# Patient Record
Sex: MALE | URBAN - METROPOLITAN AREA
[De-identification: names, ages, dates, MRNs, and addresses within clinical notes are randomized per-mention and may not be internally consistent; named-entity substitution may affect disease eponyms.]

---

## 2018-02-26 ENCOUNTER — RECORDS - HEALTHEAST (OUTPATIENT)
Dept: LAB | Facility: CLINIC | Age: 83
End: 2018-02-26

## 2018-02-27 ENCOUNTER — NURSING HOME VISIT (OUTPATIENT)
Dept: GERIATRICS | Facility: CLINIC | Age: 83
End: 2018-02-27
Payer: MEDICARE

## 2018-02-27 VITALS
TEMPERATURE: 96.6 F | HEART RATE: 63 BPM | OXYGEN SATURATION: 95 % | SYSTOLIC BLOOD PRESSURE: 121 MMHG | DIASTOLIC BLOOD PRESSURE: 68 MMHG | RESPIRATION RATE: 20 BRPM | WEIGHT: 187.3 LBS

## 2018-02-27 DIAGNOSIS — N40.0 BENIGN PROSTATIC HYPERPLASIA, UNSPECIFIED WHETHER LOWER URINARY TRACT SYMPTOMS PRESENT: ICD-10-CM

## 2018-02-27 DIAGNOSIS — I35.0 AORTIC VALVE STENOSIS, ETIOLOGY OF CARDIAC VALVE DISEASE UNSPECIFIED: ICD-10-CM

## 2018-02-27 DIAGNOSIS — R53.81 PHYSICAL DECONDITIONING: ICD-10-CM

## 2018-02-27 DIAGNOSIS — R42 DIZZINESS: Primary | ICD-10-CM

## 2018-02-27 DIAGNOSIS — I10 ESSENTIAL HYPERTENSION: ICD-10-CM

## 2018-02-27 DIAGNOSIS — K21.9 GASTROESOPHAGEAL REFLUX DISEASE, ESOPHAGITIS PRESENCE NOT SPECIFIED: ICD-10-CM

## 2018-02-27 DIAGNOSIS — G47.00 INSOMNIA, UNSPECIFIED TYPE: ICD-10-CM

## 2018-02-27 PROBLEM — K59.01 SLOW TRANSIT CONSTIPATION: Status: ACTIVE | Noted: 2018-02-27

## 2018-02-27 PROBLEM — F32.A DEPRESSION, UNSPECIFIED DEPRESSION TYPE: Status: ACTIVE | Noted: 2018-02-27
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
## 2018-02-27 PROCEDURE — 99309 SBSQ NF CARE MODERATE MDM 30: CPT | Performed by: NURSE PRACTITIONER

## 2018-02-27 RX ORDER — QUETIAPINE FUMARATE 25 MG/1
25 TABLET, FILM COATED ORAL AT BEDTIME
Qty: 30 TABLET | Refills: 1
Start: 2018-02-27

## 2018-02-27 RX ORDER — EMOLLIENT BASE
CREAM (GRAM) TOPICAL 2 TIMES DAILY PRN
COMMUNITY

## 2018-02-27 RX ORDER — PHENOL 1.4 %
1 AEROSOL, SPRAY (ML) MUCOUS MEMBRANE 2 TIMES DAILY
COMMUNITY

## 2018-02-27 RX ORDER — HYDROXYCHLOROQUINE SULFATE 200 MG/1
200 TABLET, FILM COATED ORAL 2 TIMES DAILY
COMMUNITY

## 2018-02-27 RX ORDER — CYANOCOBALAMIN (VITAMIN B-12) 500 MCG
400 LOZENGE ORAL DAILY
COMMUNITY

## 2018-02-27 RX ORDER — GUAIFENESIN 200 MG/10ML
10 LIQUID ORAL EVERY 4 HOURS PRN
COMMUNITY

## 2018-02-27 RX ORDER — TAMSULOSIN HYDROCHLORIDE 0.4 MG/1
0.4 CAPSULE ORAL DAILY
COMMUNITY

## 2018-02-27 RX ORDER — FLUTICASONE PROPIONATE 50 MCG
1 SPRAY, SUSPENSION (ML) NASAL DAILY PRN
COMMUNITY

## 2018-02-27 NOTE — PROGRESS NOTES
"[facility]  PRIMARY CARE PROVIDER AND CLINIC:  [facility] [address] / [facility] [address]  Chief Complaint   Patient presents with     Hospital F/U       HPI:    Jesus Kirkland is a 89 year old  (9/29/1925),admitted to the [facility] from [facility].  Hospital stay 2/19/18 through 2/23/18.  Admitted to this facility for  rehab, medical management and nursing care.      H&P Per [facility] Admission Note 2/19/18:        HPI information obtained from: facility chart records, facility staff and patient report.      Current issues are:      Dizziness  No dizziness since his original fall. On day of admission he did have orthostatic hypotension, so it was monitored the next several days. No recurrence.    Physical deconditioning  Working with PT/OT. Walking 150 feet with 4-wheeled walker. Independent with transfers    Aortic valve stenosis, etiology of cardiac valve disease unspecified  See above    Essential hypertension  BP readings range:  121/68  114/64  138/82  120/73  145/68  131/66  144/68  136/68  131/73  130/69  121/71    Gastroesophageal reflux disease, esophagitis presence not specified  Denies dyspepsia    Benign prostatic hyperplasia, unspecified whether lower urinary tract symptoms present  Current regimen Flomax 0.4mg po daily. Denies urinary retention    Insomnia, unspecified type  Patient has been taking melatonin and Seroquel at home. [facility] were for Seroquel 50mg take 1/2 tab qhs. This was unfortunately transcribed wrong and he has been getting 50mg qhs. He has noticed feeling a little \"goofy\" but again no dizziness or falls. He says he started taking the Seroquel due to severe anxiety about his health that was keeping him awake all night. This has now improved. He might be interested in trying to wean Seroquel        CODE STATUS/ADVANCE DIRECTIVES DISCUSSION:   DNR / DNI  Patient's living condition: [facility] " [address]ments    ALLERGIES:Oxybutynin  PAST MEDICAL HISTORY:  has no past medical history on file.  PAST SURGICAL HISTORY:  has no past surgical history on file.  FAMILY HISTORY: family history is not on file.  SOCIAL HISTORY:      Post Discharge Medication Reconciliation Status: discharge medications reconciled and changed, per note/orders (see AVS).  Current Outpatient Prescriptions   Medication Sig Dispense Refill     Acetaminophen (TYLENOL PO) Take 325-650 mg by mouth every 4 hours as needed for mild pain or fever       AmLODIPine Besylate (NORVASC PO) Take 10 mg by mouth daily       ASPIRIN EC PO Take 81 mg by mouth daily       calcium carbonate (OS-MARTÍN 600 MG [place]. [place]) 600 MG tablet Take 1 tablet by mouth 2 times daily       benzocaine-menthol (CEPACOL SORE THROAT) 15-3.6 MG lozenge Place 1 lozenge inside cheek every 4 hours as needed for moderate pain       CHLORTHALIDONE PO Take 12.5 mg by mouth daily       Omega-3 Fatty Acids (FISH OIL PO) Take 1,000 mg by mouth daily       FLUOXETINE HCL PO Take 10 mg by mouth daily       fluticasone (FLONASE) 50 MCG/ACT spray Spray 1 spray into both nostrils daily as needed for rhinitis or allergies       guaiFENesin (ROBITUSSIN) 100 MG/5ML LIQD Take 10 mLs by mouth every 4 hours as needed for cough       LOSARTAN POTASSIUM PO Take 75 mg by mouth daily       hydroxychloroquine (PLAQUENIL) 200 MG tablet Take 200 mg by mouth daily       MELATONIN PO Take 6 mg by mouth nightly as needed       Multiple Vitamins-Minerals (MULTIVITAMIN PO) Take 1 tablet by mouth 2 times daily       PREDNISONE PO Take 5 mg by mouth daily       QUEtiapine Fumarate (SEROQUEL PO) Take 50 mg by mouth At Bedtime       tamsulosin (FLOMAX) 0.4 MG capsule Take 0.4 mg by mouth daily       emollient (VANICREAM) cream Apply topically 2 times daily as needed for other       vitamin E 400 UNITS TABS Take 400 Units by mouth daily         ROS:  10 point ROS of systems including Constitutional, Eyes, Respiratory,  Cardiovascular, Gastroenterology, Genitourinary, Integumentary, Muscularskeletal, Psychiatric were all negative except for pertinent positives noted in my HPI.    Exam:  /68  Pulse 63  Temp 96.6  F (35.9  C)  Resp 20  Wt 187 lb 4.8 oz (85 kg)  SpO2 95%  GENERAL APPEARANCE:  Alert, in no distress, oriented  ENT:  Mouth and posterior oropharynx normal, moist mucous membranes  EYES:  EOM, conjunctivae, lids, pupils and irises normal  NECK:  No adenopathy,masses or thyromegaly  RESP:  respiratory effort and palpation of chest normal, lungs clear to auscultation , no respiratory distress  CV:  Palpation and auscultation of heart done , no edema, rate-normal, grade 3/6 murmur  ABDOMEN:  normal bowel sounds, soft, nontender, no hepatosplenomegaly or other masses  SKIN:  Inspection of skin and subcutaneous tissue baseline, Palpation of skin and subcutaneous tissue baseline  PSYCH:  oriented X 3, normal insight, judgement and memory, affect and mood normal    Lab/Diagnostic data:     Labs per [facility]:        ASSESSMENT/PLAN:  (R42) Dizziness  (primary encounter diagnosis)  Comment: Not currently an issue. It is surprising and fortunate that the inadvertent increase in Seroquel has not caused any side effects at this time. No orthostatic hypotension  Plan: Monitor for dizziness, falls    (R53.81) Physical deconditioning  Comment: Mild. Goal is to return back to his apartment  Plan: PT/OT eval and treat, discharge planning per their recommendations.    (I35.0) Aortic valve stenosis, etiology of cardiac valve disease unspecified  Comment: Chronic.   Plan: Continue current POC with no changes at this time and adjustments as needed. [facility]    (I10) Essential hypertension  Comment: Controlled. Due to aortic stenosis, would prefer BP<130/80, but due to falls and age, will keep goal at <140/80  Plan: Continue current POC with no changes at this time. Monitor BP. Adjust medication as clinically  indicated.    (K21.9) Gastroesophageal reflux disease, esophagitis presence not specified  Comment: Asymptomatic  Plan: Continue current POC with no changes at this time and adjustments as needed.    (N40.0) Benign prostatic hyperplasia, unspecified whether lower urinary tract symptoms present  Comment: No acute concerns  Plan: Continue current POC with no changes at this time and adjustments as needed.    (G47.00) Insomnia, unspecified type  Comment: Chronic. He likely could decrease his Seroquel dose, but due to the fact that he had 50mg for several days, will continue with 25mg for now.  Plan: Change seroquel to 25mg qhs per the original order. Continue melatonin.          Orders:  Change seroquel to 25mg qhs per the original order        Electronically signed by:  ROSEY Acharya [facility]  Pager: [phone]

## 2018-02-27 NOTE — LETTER
"    2/27/2018        RE: Jesus Kirkland  [address]  [place] [address]        [facility]  PRIMARY CARE PROVIDER AND CLINIC:  [facility] [address] / [facility] [address]  Chief Complaint   Patient presents with     Hospital F/U       HPI:    Jesus Kirkland is a 89 year old  (9/29/1925),admitted to the [facility] from [facility].  Hospital stay 2/19/18 through 2/23/18.  Admitted to this facility for  rehab, medical management and nursing care.      H&P Per [facility] Admission Note 2/19/18:        HPI information obtained from: facility chart records, facility staff and patient report.      Current issues are:      Dizziness  No dizziness since his original fall. On day of admission he did have orthostatic hypotension, so it was monitored the next several days. No recurrence.    Physical deconditioning  Working with PT/OT. Walking 150 feet with 4-wheeled walker. Independent with transfers    Aortic valve stenosis, etiology of cardiac valve disease unspecified  See above    Essential hypertension  BP readings range:  121/68  114/64  138/82  120/73  145/68  131/66  144/68  136/68  131/73  130/69  121/71    Gastroesophageal reflux disease, esophagitis presence not specified  Denies dyspepsia    Benign prostatic hyperplasia, unspecified whether lower urinary tract symptoms present  Current regimen Flomax 0.4mg po daily. Denies urinary retention    Insomnia, unspecified type  Patient has been taking melatonin and Seroquel at home. [facility] orders were for Seroquel 50mg take 1/2 tab qhs. This was unfortunately transcribed wrong and he has been getting 50mg qhs. He has noticed feeling a little \"goofy\" but again no dizziness or falls. He says he started taking the Seroquel due to severe anxiety about his health that was keeping him awake all night. This has now improved. He might be interested in trying to wean Seroquel        CODE " STATUS/ADVANCE DIRECTIVES DISCUSSION:   DNR / DNI  Patient's living condition: [facility]    ALLERGIES:Oxybutynin  PAST MEDICAL HISTORY:  has no past medical history on file.  PAST SURGICAL HISTORY:  has no past surgical history on file.  FAMILY HISTORY: family history is not on file.  SOCIAL HISTORY:      Post Discharge Medication Reconciliation Status: discharge medications reconciled and changed, per note/orders (see AVS).  Current Outpatient Prescriptions   Medication Sig Dispense Refill     Acetaminophen (TYLENOL PO) Take 325-650 mg by mouth every 4 hours as needed for mild pain or fever       AmLODIPine Besylate (NORVASC PO) Take 10 mg by mouth daily       ASPIRIN EC PO Take 81 mg by mouth daily       calcium carbonate (OS-MARTÍN 600 MG [place]. [place]) 600 MG tablet Take 1 tablet by mouth 2 times daily       benzocaine-menthol (CEPACOL SORE THROAT) 15-3.6 MG lozenge Place 1 lozenge inside cheek every 4 hours as needed for moderate pain       CHLORTHALIDONE PO Take 12.5 mg by mouth daily       Omega-3 Fatty Acids (FISH OIL PO) Take 1,000 mg by mouth daily       FLUOXETINE HCL PO Take 10 mg by mouth daily       fluticasone (FLONASE) 50 MCG/ACT spray Spray 1 spray into both nostrils daily as needed for rhinitis or allergies       guaiFENesin (ROBITUSSIN) 100 MG/5ML LIQD Take 10 mLs by mouth every 4 hours as needed for cough       LOSARTAN POTASSIUM PO Take 75 mg by mouth daily       hydroxychloroquine (PLAQUENIL) 200 MG tablet Take 200 mg by mouth daily       MELATONIN PO Take 6 mg by mouth nightly as needed       Multiple Vitamins-Minerals (MULTIVITAMIN PO) Take 1 tablet by mouth 2 times daily       PREDNISONE PO Take 5 mg by mouth daily       QUEtiapine Fumarate (SEROQUEL PO) Take 50 mg by mouth At Bedtime       tamsulosin (FLOMAX) 0.4 MG capsule Take 0.4 mg by mouth daily       emollient (VANICREAM) cream Apply topically 2 times daily as needed for other       vitamin E 400 UNITS TABS Take 400 Units by mouth  daily         ROS:  10 point ROS of systems including Constitutional, Eyes, Respiratory, Cardiovascular, Gastroenterology, Genitourinary, Integumentary, Muscularskeletal, Psychiatric were all negative except for pertinent positives noted in my HPI.    Exam:  /68  Pulse 63  Temp 96.6  F (35.9  C)  Resp 20  Wt 187 lb 4.8 oz (85 kg)  SpO2 95%  GENERAL APPEARANCE:  Alert, in no distress, oriented  ENT:  Mouth and posterior oropharynx normal, moist mucous membranes  EYES:  EOM, conjunctivae, lids, pupils and irises normal  NECK:  No adenopathy,masses or thyromegaly  RESP:  respiratory effort and palpation of chest normal, lungs clear to auscultation , no respiratory distress  CV:  Palpation and auscultation of heart done , no edema, rate-normal, grade 3/6 murmur  ABDOMEN:  normal bowel sounds, soft, nontender, no hepatosplenomegaly or other masses  SKIN:  Inspection of skin and subcutaneous tissue baseline, Palpation of skin and subcutaneous tissue baseline  PSYCH:  oriented X 3, normal insight, judgement and memory, affect and mood normal    Lab/Diagnostic data:     Labs per [facility]:        ASSESSMENT/PLAN:  (R42) Dizziness  (primary encounter diagnosis)  Comment: Not currently an issue. It is surprising and fortunate that the inadvertent increase in Seroquel has not caused any side effects at this time. No orthostatic hypotension  Plan: Monitor for dizziness, falls    (R53.81) Physical deconditioning  Comment: Mild. Goal is to return back to his apartment  Plan: PT/OT eval and treat, discharge planning per their recommendations.    (I35.0) Aortic valve stenosis, etiology of cardiac valve disease unspecified  Comment: Chronic.   Plan: Continue current POC with no changes at this time and adjustments as needed. [facility]    (I10) Essential hypertension  Comment: Controlled. Due to aortic stenosis, would prefer BP<130/80, but due to falls and age, will keep goal at <140/80  Plan: Continue current POC  with no changes at this time. Monitor BP. Adjust medication as clinically indicated.    (K21.9) Gastroesophageal reflux disease, esophagitis presence not specified  Comment: Asymptomatic  Plan: Continue current POC with no changes at this time and adjustments as needed.    (N40.0) Benign prostatic hyperplasia, unspecified whether lower urinary tract symptoms present  Comment: No acute concerns  Plan: Continue current POC with no changes at this time and adjustments as needed.    (G47.00) Insomnia, unspecified type  Comment: Chronic. He likely could decrease his Seroquel dose, but due to the fact that he had 50mg for several days, will continue with 25mg for now.  Plan: Change seroquel to 25mg qhs per the original order. Continue melatonin.          Orders:  Change seroquel to 25mg qhs per the original order        Electronically signed by:  ROSEY Acharya [facility]  Pager: [phone]

## 2018-02-28 LAB
QTF INTERPRETATION: NORMAL
QTF MITOGEN - NIL: >10 IU/ML
QTF NIL: 0.04 IU/ML
QTF RESULT: NEGATIVE
QTF TB ANTIGEN - NIL: 0.01 IU/ML

## 2018-03-02 ENCOUNTER — DISCHARGE SUMMARY NURSING HOME (OUTPATIENT)
Dept: GERIATRICS | Facility: CLINIC | Age: 83
End: 2018-03-02
Payer: MEDICARE

## 2018-03-02 VITALS
RESPIRATION RATE: 18 BRPM | DIASTOLIC BLOOD PRESSURE: 63 MMHG | WEIGHT: 185.1 LBS | SYSTOLIC BLOOD PRESSURE: 104 MMHG | OXYGEN SATURATION: 98 % | HEART RATE: 74 BPM | TEMPERATURE: 97 F

## 2018-03-02 DIAGNOSIS — I35.0 AORTIC VALVE STENOSIS, ETIOLOGY OF CARDIAC VALVE DISEASE UNSPECIFIED: ICD-10-CM

## 2018-03-02 DIAGNOSIS — R53.81 PHYSICAL DECONDITIONING: ICD-10-CM

## 2018-03-02 DIAGNOSIS — G47.00 INSOMNIA, UNSPECIFIED TYPE: ICD-10-CM

## 2018-03-02 DIAGNOSIS — F41.1 GAD (GENERALIZED ANXIETY DISORDER): ICD-10-CM

## 2018-03-02 DIAGNOSIS — K21.9 GASTROESOPHAGEAL REFLUX DISEASE, ESOPHAGITIS PRESENCE NOT SPECIFIED: ICD-10-CM

## 2018-03-02 DIAGNOSIS — I10 ESSENTIAL HYPERTENSION: ICD-10-CM

## 2018-03-02 DIAGNOSIS — R42 DIZZINESS: Primary | ICD-10-CM

## 2018-03-02 DIAGNOSIS — N40.0 BENIGN PROSTATIC HYPERPLASIA, UNSPECIFIED WHETHER LOWER URINARY TRACT SYMPTOMS PRESENT: ICD-10-CM

## 2018-03-02 PROCEDURE — 99316 NF DSCHRG MGMT 30 MIN+: CPT | Performed by: NURSE PRACTITIONER

## 2018-03-02 NOTE — PROGRESS NOTES
[facility] DISCHARGE SUMMARY    PATIENT'S NAME: Jesus Kirkland  YOB: 1925  MEDICAL RECORD NUMBER:  [id number]    PRIMARY CARE PROVIDER AND CLINIC RESPONSIBLE AFTER TRANSFER: [facility] [address] [facility] / [facility] [address]     CODE STATUS/ADVANCE DIRECTIVES DISCUSSION:   DNR / DNI       Allergies   Allergen Reactions     Oxybutynin        TRANSFERRING PROVIDERS: ROSEY Acharya CNP, Pillo Peter MD  DATE OF SNF ADMISSION:  February / 23 / 2018  DATE OF SNF (anticipated) DISCHARGE: March / 12 / 2018  DISCHARGE DISPOSITION: Non-FMG Provider   Nursing Facility: [facility] stay 2/19/18 to 2/23/18.     Condition on Discharge:  Improving.  Function:  Ambulating community distance with 4-wheeled walker  Cognitive Scores: BIMS 13/15, SLUMS 22/30 and PHQ9 06/27.        DISCHARGE DIAGNOSIS:   1. Dizziness    2. RAFIQ (generalized anxiety disorder)    3. Insomnia, unspecified type    4. Physical deconditioning    5. Aortic valve stenosis, etiology of cardiac valve disease unspecified    6. Essential hypertension    7. Gastroesophageal reflux disease, esophagitis presence not specified    8. Benign prostatic hyperplasia, unspecified whether lower urinary tract symptoms present        HPI Nursing Facility Course:  HPI information obtained from: facility chart records, facility staff and patient report.  [facility] Hospital stay 2/19/18 through 2/23/18. He was admitted due to dizziness and a fall. No definitive cause for dizziness was found. Admitted to this facility for  rehab, medical management and nursing care.    Dizziness  On day of admission he did have orthostatic hypotension, so it was monitored the next several days. No recurrence. He has had dizziness once or twice in the last several days.  He is wondering if it is related to his medications. His BP is generally stable. See RAFIQ.    RAFIQ  (generalized anxiety disorder)  Insomnia, unspecified type  [facility] due to severe anxiety. This is when Prozac and seroquel were started. He was having insomnia due to severe anxiety and this anxiety mostly revolved around his elevated blood pressure. He was worried about having a stroke or heart attack. He feels that his anxiety is improved. It can be challenging to change medications and have this communicated to the [facility]. Given that he is going home and has a [facility] follow up next week, no meds were changed, but he was given the following advice: He is to try taking Seroquel 12.5mg at bedtime. If he feels like he needs something to help him sleep, he may want to try trazodone. Additionally, for his anxiety, Prozac is not recommended in the elderly, suggested he ask about Remeron. Remeron could certainly help with both anxiety and insomnia.    Physical deconditioning  Completed PT/OT. Walking 150 feet with 4-wheeled walker. Independent with transfers    Aortic valve stenosis, etiology of cardiac valve disease unspecified  Cardiology felt that this was a likely cause of his dizziness. He also has ARROYO. There have been discussions regarding possible TAVR. F/u cardiology    Essential hypertension  BP readings range:  104/63  151/66  126/58  145/74  135/70  123/60  121/68  114/64  138/82  120/73  145/68      Gastroesophageal reflux disease, esophagitis presence not specified  Denies dyspepsia    Benign prostatic hyperplasia, unspecified whether lower urinary tract symptoms present  Current regimen Flomax 0.4mg po daily. Denies urinary retention        PAST MEDICAL HISTORY:  has no past medical history on file.    DISCHARGE MEDICATIONS:  Current Outpatient Prescriptions   Medication Sig Dispense Refill     VITAMIN D, CHOLECALCIFEROL, PO Take 2,000 Units by mouth daily       Acetaminophen (TYLENOL PO) Take 325-650 mg by mouth every 4 hours as needed for mild pain or fever        AmLODIPine Besylate (NORVASC PO) Take 10 mg by mouth daily       ASPIRIN EC PO Take 81 mg by mouth daily       calcium carbonate (OS-MARTÍN 600 MG [place]. [place]) 600 MG tablet Take 1 tablet by mouth 2 times daily       benzocaine-menthol (CEPACOL SORE THROAT) 15-3.6 MG lozenge Place 1 lozenge inside cheek every 4 hours as needed for moderate pain       CHLORTHALIDONE PO Take 12.5 mg by mouth daily       Omega-3 Fatty Acids (FISH OIL PO) Take 1,000 mg by mouth daily       FLUOXETINE HCL PO Take 10 mg by mouth daily       fluticasone (FLONASE) 50 MCG/ACT spray Spray 1 spray into both nostrils daily as needed for rhinitis or allergies       guaiFENesin (ROBITUSSIN) 100 MG/5ML LIQD Take 10 mLs by mouth every 4 hours as needed for cough       LOSARTAN POTASSIUM PO Take 75 mg by mouth daily       hydroxychloroquine (PLAQUENIL) 200 MG tablet Take 200 mg by mouth 2 times daily        MELATONIN PO Take 6 mg by mouth nightly as needed       Multiple Vitamins-Minerals (MULTIVITAMIN PO) Take 1 tablet by mouth 2 times daily       PREDNISONE PO Take 5 mg by mouth daily       tamsulosin (FLOMAX) 0.4 MG capsule Take 0.4 mg by mouth daily       emollient (VANICREAM) cream Apply topically 2 times daily as needed for other       vitamin E 400 UNITS TABS Take 400 Units by mouth daily       QUEtiapine (SEROQUEL) 25 MG tablet Take 1 tablet (25 mg) by mouth At Bedtime 30 tablet 1       MEDICATION CHANGES/RATIONALE:   None    Controlled medications sent with patient:   not applicable/none     ROS:    10 point ROS of systems including Constitutional, Eyes, Respiratory, Cardiovascular, Gastroenterology, Genitourinary, Integumentary, Muscularskeletal, Psychiatric were all negative except for pertinent positives noted in my HPI.    Physical Exam:   Vitals: /63  Pulse 74  Temp 97  F (36.1  C)  Resp 18  Wt 185 lb 1.6 oz (84 kg)  SpO2 98%  BMI= There is no height or weight on file to calculate BMI.    GENERAL APPEARANCE:  Alert, in no  distress, oriented  ENT:  Mouth and posterior oropharynx normal, moist mucous membranes, normal hearing acuity  EYES:  EOM, conjunctivae, lids, pupils and irises normal  NECK:  No adenopathy,masses or thyromegaly  RESP:  respiratory effort and palpation of chest normal, lungs clear to auscultation , no respiratory distress  CV:  Palpation and auscultation of heart done , no edema, rate-normal, grade 3/6 murmur  ABDOMEN:  normal bowel sounds, soft, nontender, no hepatosplenomegaly or other masses  M/S:   Gait and station normal  Digits and nails normal  SKIN:  Inspection of skin and subcutaneous tissue baseline, Palpation of skin and subcutaneous tissue baseline  PSYCH:  oriented X 3, normal insight, judgement and memory, affect and mood normal    DISCHARGE PLAN:  Registered Nurse and Home Health Aide. D/c with medications per protocol. HHC RN for medications mangament and skin tear to left upper arm wound care: Cleanse w/NS or wound cleanser; apply skin prep tp periwound skin; apply small piece of adaptic or petrolatum gauze over open areas; cover w/ silicine foam border dressing. HHC RN for Medication management and HHA to assess for any and all services incliding bathing assist. .     Patient instructed to follow-up with:  PCP in 7 days      Future Appointments:   [facility]    [facility] referral needed and placed by this provider: No    Pending labs: none    SNF labs:      [facility]            Discharge Treatments: none    TOTAL DISCHARGE TIME:   Greater than 30 minutes     Electronically signed by:  ROSEY Acharya CNP   [facility]  Pager: [phone]      Case reviewed with Giulia Chan CNP on 3/2/18    Pillo Peter MD

## 2018-03-02 NOTE — LETTER
3/2/2018        RE: Jesus Kirkland  [address]  [facility] [id number]          [facility] DISCHARGE SUMMARY    PATIENT'S NAME: Jesus Kirkland  YOB: 1925  MEDICAL RECORD NUMBER:  [id number]    PRIMARY CARE PROVIDER AND CLINIC RESPONSIBLE AFTER TRANSFER: [place], [facility] [address] / [facility] [id number]     CODE STATUS/ADVANCE DIRECTIVES DISCUSSION:   DNR / DNI       Allergies   Allergen Reactions     Oxybutynin        TRANSFERRING PROVIDERS: ROSEY Acharya CNP, Pillo Petre MD  DATE OF SNF ADMISSION:  February / 23 / 2018  DATE OF SNF (anticipated) DISCHARGE: March / 12 / 2018  DISCHARGE DISPOSITION: Non-[facility] Provider   Nursing Facility: [facility] stay 2/19/18 to 2/23/18.     Condition on Discharge:  Improving.  Function:  Ambulating community distance with 4-wheeled walker  Cognitive Scores: BIMS 13/15, SLUMS 22/30 and PHQ9 06/27.        DISCHARGE DIAGNOSIS:   1. Dizziness    2. RAFIQ (generalized anxiety disorder)    3. Insomnia, unspecified type    4. Physical deconditioning    5. Aortic valve stenosis, etiology of cardiac valve disease unspecified    6. Essential hypertension    7. Gastroesophageal reflux disease, esophagitis presence not specified    8. Benign prostatic hyperplasia, unspecified whether lower urinary tract symptoms present        HPI Nursing Facility Course:  HPI information obtained from: facility chart records, facility staff and patient report.  [facility] stay 2/19/18 through 2/23/18. He was admitted due to dizziness and a fall. No definitive cause for dizziness was found. Admitted to this facility for  rehab, medical management and nursing care.    Dizziness  On day of admission he did have orthostatic hypotension, so it was monitored the next several days. No recurrence. He has had dizziness once or twice in the last several days.  He is wondering  if it is related to his medications. His BP is generally stable. See RAFIQ.    RAFIQ (generalized anxiety disorder)  Insomnia, unspecified type  [facility]. This is when Prozac and seroquel were started. He was having insomnia due to severe anxiety and this anxiety mostly revolved around his elevated blood pressure. He was worried about having a stroke or heart attack. He feels that his anxiety is improved. It can be challenging to change medications and have this communicated to the [facility]. Given that he is going home and has a [facility] follow up next week, no meds were changed, but he was given the following advice: He is to try taking Seroquel 12.5mg at bedtime. If he feels like he needs something to help him sleep, he may want to try trazodone. Additionally, for his anxiety, Prozac is not recommended in the elderly, suggested he ask about Remeron. Remeron could certainly help with both anxiety and insomnia.    Physical deconditioning  Completed PT/OT. Walking 150 feet with 4-wheeled walker. Independent with transfers    Aortic valve stenosis, etiology of cardiac valve disease unspecified  Cardiology felt that this was a likely cause of his dizziness. He also has ARROYO. There have been discussions regarding possible TAVR. F/u cardiology    Essential hypertension  BP readings range:  104/63  151/66  126/58  145/74  135/70  123/60  121/68  114/64  138/82  120/73  145/68      Gastroesophageal reflux disease, esophagitis presence not specified  Denies dyspepsia    Benign prostatic hyperplasia, unspecified whether lower urinary tract symptoms present  Current regimen Flomax 0.4mg po daily. Denies urinary retention        PAST MEDICAL HISTORY:  has no past medical history on file.    DISCHARGE MEDICATIONS:  Current Outpatient Prescriptions   Medication Sig Dispense Refill     VITAMIN D, CHOLECALCIFEROL, PO Take 2,000 Units by mouth daily       Acetaminophen (TYLENOL  PO) Take 325-650 mg by mouth every 4 hours as needed for mild pain or fever       AmLODIPine Besylate (NORVASC PO) Take 10 mg by mouth daily       ASPIRIN EC PO Take 81 mg by mouth daily       calcium carbonate (OS-MARTÍN 600 MG [place]. [place]) 600 MG tablet Take 1 tablet by mouth 2 times daily       benzocaine-menthol (CEPACOL SORE THROAT) 15-3.6 MG lozenge Place 1 lozenge inside cheek every 4 hours as needed for moderate pain       CHLORTHALIDONE PO Take 12.5 mg by mouth daily       Omega-3 Fatty Acids (FISH OIL PO) Take 1,000 mg by mouth daily       FLUOXETINE HCL PO Take 10 mg by mouth daily       fluticasone (FLONASE) 50 MCG/ACT spray Spray 1 spray into both nostrils daily as needed for rhinitis or allergies       guaiFENesin (ROBITUSSIN) 100 MG/5ML LIQD Take 10 mLs by mouth every 4 hours as needed for cough       LOSARTAN POTASSIUM PO Take 75 mg by mouth daily       hydroxychloroquine (PLAQUENIL) 200 MG tablet Take 200 mg by mouth 2 times daily        MELATONIN PO Take 6 mg by mouth nightly as needed       Multiple Vitamins-Minerals (MULTIVITAMIN PO) Take 1 tablet by mouth 2 times daily       PREDNISONE PO Take 5 mg by mouth daily       tamsulosin (FLOMAX) 0.4 MG capsule Take 0.4 mg by mouth daily       emollient (VANICREAM) cream Apply topically 2 times daily as needed for other       vitamin E 400 UNITS TABS Take 400 Units by mouth daily       QUEtiapine (SEROQUEL) 25 MG tablet Take 1 tablet (25 mg) by mouth At Bedtime 30 tablet 1       MEDICATION CHANGES/RATIONALE:   None    Controlled medications sent with patient:   not applicable/none     ROS:    10 point ROS of systems including Constitutional, Eyes, Respiratory, Cardiovascular, Gastroenterology, Genitourinary, Integumentary, Muscularskeletal, Psychiatric were all negative except for pertinent positives noted in my HPI.    Physical Exam:   Vitals: /63  Pulse 74  Temp 97  F (36.1  C)  Resp 18  Wt 185 lb 1.6 oz (84 kg)  SpO2 98%  BMI= There is no height  or weight on file to calculate BMI.    GENERAL APPEARANCE:  Alert, in no distress, oriented  ENT:  Mouth and posterior oropharynx normal, moist mucous membranes, normal hearing acuity  EYES:  EOM, conjunctivae, lids, pupils and irises normal  NECK:  No adenopathy,masses or thyromegaly  RESP:  respiratory effort and palpation of chest normal, lungs clear to auscultation , no respiratory distress  CV:  Palpation and auscultation of heart done , no edema, rate-normal, grade 3/6 murmur  ABDOMEN:  normal bowel sounds, soft, nontender, no hepatosplenomegaly or other masses  M/S:   Gait and station normal  Digits and nails normal  SKIN:  Inspection of skin and subcutaneous tissue baseline, Palpation of skin and subcutaneous tissue baseline  PSYCH:  oriented X 3, normal insight, judgement and memory, affect and mood normal    DISCHARGE PLAN:  Registered Nurse and Home Health Aide. D/c with medications per protocol. [facility] RN for medications mangament and skin tear to left upper arm wound care: Cleanse w/NS or wound cleanser; apply skin prep tp periwound skin; apply small piece of adaptic or petrolatum gauze over open areas; cover w/ silicine foam border dressing. [facility] RN for Medication management and [facility]HA to assess for any and all services incliding bathing assist. .     Patient instructed to follow-up with:  PCP in 7 days      Future Appointments:   [facility]    [facility] referral needed and placed by this provider: No    Pending labs: none    SNF labs:      [facility]:            Discharge Treatments: none    TOTAL DISCHARGE TIME:   Greater than 30 minutes     Electronically signed by:  ROSEY Acharya [facility]  Pager: [phone]

## 2019-11-10 ENCOUNTER — RECORDS - HEALTHEAST (OUTPATIENT)
Dept: LAB | Facility: CLINIC | Age: 84
End: 2019-11-10

## 2019-11-10 LAB
C DIFF TOX B STL QL: POSITIVE
RIBOTYPE 027/NAP1/BI: ABNORMAL

## 2019-11-26 ENCOUNTER — RECORDS - HEALTHEAST (OUTPATIENT)
Dept: LAB | Facility: CLINIC | Age: 84
End: 2019-11-26

## 2019-11-27 LAB
ANION GAP SERPL CALCULATED.3IONS-SCNC: 9 MMOL/L (ref 5–18)
BUN SERPL-MCNC: 41 MG/DL (ref 8–28)
CALCIUM SERPL-MCNC: 8.8 MG/DL (ref 8.5–10.5)
CHLORIDE BLD-SCNC: 97 MMOL/L (ref 98–107)
CO2 SERPL-SCNC: 21 MMOL/L (ref 22–31)
CREAT SERPL-MCNC: 1.32 MG/DL (ref 0.7–1.3)
GFR SERPL CREATININE-BSD FRML MDRD: 51 ML/MIN/1.73M2
GLUCOSE BLD-MCNC: 101 MG/DL (ref 70–125)
POTASSIUM BLD-SCNC: 6.1 MMOL/L (ref 3.5–5)
SODIUM SERPL-SCNC: 127 MMOL/L (ref 136–145)

## 2019-12-02 ENCOUNTER — RECORDS - HEALTHEAST (OUTPATIENT)
Dept: LAB | Facility: CLINIC | Age: 84
End: 2019-12-02

## 2019-12-02 LAB
ANION GAP SERPL CALCULATED.3IONS-SCNC: 9 MMOL/L (ref 5–18)
BUN SERPL-MCNC: 30 MG/DL (ref 8–28)
CALCIUM SERPL-MCNC: 8.7 MG/DL (ref 8.5–10.5)
CHLORIDE BLD-SCNC: 100 MMOL/L (ref 98–107)
CO2 SERPL-SCNC: 23 MMOL/L (ref 22–31)
CREAT SERPL-MCNC: 1.02 MG/DL (ref 0.7–1.3)
GFR SERPL CREATININE-BSD FRML MDRD: >60 ML/MIN/1.73M2
GLUCOSE BLD-MCNC: 107 MG/DL (ref 70–125)
POTASSIUM BLD-SCNC: 5 MMOL/L (ref 3.5–5)
SODIUM SERPL-SCNC: 132 MMOL/L (ref 136–145)

## 2019-12-03 ENCOUNTER — RECORDS - HEALTHEAST (OUTPATIENT)
Dept: LAB | Facility: CLINIC | Age: 84
End: 2019-12-03

## 2019-12-04 LAB
ANION GAP SERPL CALCULATED.3IONS-SCNC: 8 MMOL/L (ref 5–18)
BUN SERPL-MCNC: 23 MG/DL (ref 8–28)
CALCIUM SERPL-MCNC: 8.9 MG/DL (ref 8.5–10.5)
CHLORIDE BLD-SCNC: 100 MMOL/L (ref 98–107)
CO2 SERPL-SCNC: 27 MMOL/L (ref 22–31)
CREAT SERPL-MCNC: 1.11 MG/DL (ref 0.7–1.3)
GFR SERPL CREATININE-BSD FRML MDRD: >60 ML/MIN/1.73M2
GLUCOSE BLD-MCNC: 81 MG/DL (ref 70–125)
POTASSIUM BLD-SCNC: 4.7 MMOL/L (ref 3.5–5)
SODIUM SERPL-SCNC: 135 MMOL/L (ref 136–145)

## 2019-12-10 ENCOUNTER — RECORDS - HEALTHEAST (OUTPATIENT)
Dept: LAB | Facility: CLINIC | Age: 84
End: 2019-12-10

## 2019-12-11 LAB
ANION GAP SERPL CALCULATED.3IONS-SCNC: 8 MMOL/L (ref 5–18)
BUN SERPL-MCNC: 25 MG/DL (ref 8–28)
CALCIUM SERPL-MCNC: 8.7 MG/DL (ref 8.5–10.5)
CHLORIDE BLD-SCNC: 102 MMOL/L (ref 98–107)
CO2 SERPL-SCNC: 28 MMOL/L (ref 22–31)
CREAT SERPL-MCNC: 1.05 MG/DL (ref 0.7–1.3)
GFR SERPL CREATININE-BSD FRML MDRD: >60 ML/MIN/1.73M2
GLUCOSE BLD-MCNC: 78 MG/DL (ref 70–125)
POTASSIUM BLD-SCNC: 4.1 MMOL/L (ref 3.5–5)
SODIUM SERPL-SCNC: 138 MMOL/L (ref 136–145)

## 2020-02-25 ENCOUNTER — RECORDS - HEALTHEAST (OUTPATIENT)
Dept: LAB | Facility: CLINIC | Age: 85
End: 2020-02-25

## 2020-02-26 LAB
ANION GAP SERPL CALCULATED.3IONS-SCNC: 10 MMOL/L (ref 5–18)
BUN SERPL-MCNC: 35 MG/DL (ref 8–28)
CALCIUM SERPL-MCNC: 9.3 MG/DL (ref 8.5–10.5)
CHLORIDE BLD-SCNC: 104 MMOL/L (ref 98–107)
CO2 SERPL-SCNC: 27 MMOL/L (ref 22–31)
CREAT SERPL-MCNC: 0.95 MG/DL (ref 0.7–1.3)
ERYTHROCYTE [DISTWIDTH] IN BLOOD BY AUTOMATED COUNT: 12.3 % (ref 11–14.5)
GFR SERPL CREATININE-BSD FRML MDRD: >60 ML/MIN/1.73M2
GLUCOSE BLD-MCNC: 85 MG/DL (ref 70–125)
HCT VFR BLD AUTO: 30.4 % (ref 40–54)
HGB BLD-MCNC: 9.5 G/DL (ref 14–18)
MCH RBC QN AUTO: 30.8 PG (ref 27–34)
MCHC RBC AUTO-ENTMCNC: 31.3 G/DL (ref 32–36)
MCV RBC AUTO: 99 FL (ref 80–100)
PLATELET # BLD AUTO: 252 THOU/UL (ref 140–440)
PMV BLD AUTO: 12 FL (ref 8.5–12.5)
POTASSIUM BLD-SCNC: 3.8 MMOL/L (ref 3.5–5)
RBC # BLD AUTO: 3.08 MILL/UL (ref 4.4–6.2)
SODIUM SERPL-SCNC: 141 MMOL/L (ref 136–145)
WBC: 8.2 THOU/UL (ref 4–11)

## 2021-02-05 ENCOUNTER — RECORDS - HEALTHEAST (OUTPATIENT)
Dept: LAB | Facility: CLINIC | Age: 86
End: 2021-02-05

## 2021-02-05 LAB
SARS-COV-2 PCR COMMENT: NORMAL
SARS-COV-2 RNA SPEC QL NAA+PROBE: NEGATIVE
SARS-COV-2 VIRUS SPECIMEN SOURCE: NORMAL

## 2021-02-12 ENCOUNTER — RECORDS - HEALTHEAST (OUTPATIENT)
Dept: LAB | Facility: CLINIC | Age: 86
End: 2021-02-12

## 2021-02-23 ENCOUNTER — RECORDS - HEALTHEAST (OUTPATIENT)
Dept: LAB | Facility: CLINIC | Age: 86
End: 2021-02-23

## 2021-02-24 LAB
ALBUMIN SERPL-MCNC: 2.9 G/DL (ref 3.5–5)
ALP SERPL-CCNC: 61 U/L (ref 45–120)
ALT SERPL W P-5'-P-CCNC: <9 U/L (ref 0–45)
ANION GAP SERPL CALCULATED.3IONS-SCNC: 10 MMOL/L (ref 5–18)
AST SERPL W P-5'-P-CCNC: 18 U/L (ref 0–40)
BASOPHILS # BLD AUTO: 0 THOU/UL (ref 0–0.2)
BASOPHILS NFR BLD AUTO: 0 % (ref 0–2)
BILIRUB SERPL-MCNC: 0.2 MG/DL (ref 0–1)
BUN SERPL-MCNC: 38 MG/DL (ref 8–28)
CALCIUM SERPL-MCNC: 8.4 MG/DL (ref 8.5–10.5)
CHLORIDE BLD-SCNC: 105 MMOL/L (ref 98–107)
CO2 SERPL-SCNC: 27 MMOL/L (ref 22–31)
CREAT SERPL-MCNC: 1.4 MG/DL (ref 0.7–1.3)
EOSINOPHIL # BLD AUTO: 0.1 THOU/UL (ref 0–0.4)
EOSINOPHIL NFR BLD AUTO: 1 % (ref 0–6)
ERYTHROCYTE [DISTWIDTH] IN BLOOD BY AUTOMATED COUNT: 12.6 % (ref 11–14.5)
GFR SERPL CREATININE-BSD FRML MDRD: 47 ML/MIN/1.73M2
GLUCOSE BLD-MCNC: 83 MG/DL (ref 70–125)
HBA1C MFR BLD: 5.3 %
HCT VFR BLD AUTO: 27.1 % (ref 40–54)
HGB BLD-MCNC: 8.5 G/DL (ref 14–18)
IMM GRANULOCYTES # BLD: 0.1 THOU/UL
IMM GRANULOCYTES NFR BLD: 1 %
LYMPHOCYTES # BLD AUTO: 2.9 THOU/UL (ref 0.8–4.4)
LYMPHOCYTES NFR BLD AUTO: 29 % (ref 20–40)
MCH RBC QN AUTO: 30.2 PG (ref 27–34)
MCHC RBC AUTO-ENTMCNC: 31.4 G/DL (ref 32–36)
MCV RBC AUTO: 96 FL (ref 80–100)
MONOCYTES # BLD AUTO: 1.4 THOU/UL (ref 0–0.9)
MONOCYTES NFR BLD AUTO: 13 % (ref 2–10)
NEUTROPHILS # BLD AUTO: 5.7 THOU/UL (ref 2–7.7)
NEUTROPHILS NFR BLD AUTO: 56 % (ref 50–70)
PLATELET # BLD AUTO: 240 THOU/UL (ref 140–440)
PMV BLD AUTO: 11.3 FL (ref 8.5–12.5)
POTASSIUM BLD-SCNC: 3.7 MMOL/L (ref 3.5–5)
PROT SERPL-MCNC: 5.8 G/DL (ref 6–8)
RBC # BLD AUTO: 2.81 MILL/UL (ref 4.4–6.2)
SODIUM SERPL-SCNC: 142 MMOL/L (ref 136–145)
WBC: 10.3 THOU/UL (ref 4–11)

## 2021-02-26 ENCOUNTER — RECORDS - HEALTHEAST (OUTPATIENT)
Dept: LAB | Facility: CLINIC | Age: 86
End: 2021-02-26

## 2021-03-05 ENCOUNTER — RECORDS - HEALTHEAST (OUTPATIENT)
Dept: LAB | Facility: CLINIC | Age: 86
End: 2021-03-05

## 2021-04-06 ENCOUNTER — RECORDS - HEALTHEAST (OUTPATIENT)
Dept: LAB | Facility: CLINIC | Age: 86
End: 2021-04-06

## 2021-04-07 LAB
ANION GAP SERPL CALCULATED.3IONS-SCNC: 12 MMOL/L (ref 5–18)
BUN SERPL-MCNC: 35 MG/DL (ref 8–28)
CALCIUM SERPL-MCNC: 8.5 MG/DL (ref 8.5–10.5)
CHLORIDE BLD-SCNC: 104 MMOL/L (ref 98–107)
CO2 SERPL-SCNC: 25 MMOL/L (ref 22–31)
CREAT SERPL-MCNC: 1.25 MG/DL (ref 0.7–1.3)
GFR SERPL CREATININE-BSD FRML MDRD: 54 ML/MIN/1.73M2
GLUCOSE BLD-MCNC: 79 MG/DL (ref 70–125)
POTASSIUM BLD-SCNC: 3.6 MMOL/L (ref 3.5–5)
SODIUM SERPL-SCNC: 141 MMOL/L (ref 136–145)

## 2021-04-15 ENCOUNTER — RECORDS - HEALTHEAST (OUTPATIENT)
Dept: LAB | Facility: CLINIC | Age: 86
End: 2021-04-15

## 2021-04-22 ENCOUNTER — RECORDS - HEALTHEAST (OUTPATIENT)
Dept: LAB | Facility: CLINIC | Age: 86
End: 2021-04-22

## 2021-04-29 ENCOUNTER — RECORDS - HEALTHEAST (OUTPATIENT)
Dept: LAB | Facility: CLINIC | Age: 86
End: 2021-04-29

## 2021-05-04 ENCOUNTER — RECORDS - HEALTHEAST (OUTPATIENT)
Dept: LAB | Facility: CLINIC | Age: 86
End: 2021-05-04

## 2021-05-05 LAB
ANION GAP SERPL CALCULATED.3IONS-SCNC: 11 MMOL/L (ref 5–18)
BASOPHILS # BLD AUTO: 0 THOU/UL (ref 0–0.2)
BASOPHILS NFR BLD AUTO: 0 % (ref 0–2)
BUN SERPL-MCNC: 28 MG/DL (ref 8–28)
CALCIUM SERPL-MCNC: 8.6 MG/DL (ref 8.5–10.5)
CHLORIDE BLD-SCNC: 99 MMOL/L (ref 98–107)
CO2 SERPL-SCNC: 28 MMOL/L (ref 22–31)
CREAT SERPL-MCNC: 1.23 MG/DL (ref 0.7–1.3)
EOSINOPHIL # BLD AUTO: 0.1 THOU/UL (ref 0–0.4)
EOSINOPHIL NFR BLD AUTO: 2 % (ref 0–6)
ERYTHROCYTE [DISTWIDTH] IN BLOOD BY AUTOMATED COUNT: 13 % (ref 11–14.5)
FERRITIN SERPL-MCNC: 76 NG/ML (ref 27–300)
FOLATE SERPL-MCNC: 8.2 NG/ML
GFR SERPL CREATININE-BSD FRML MDRD: 55 ML/MIN/1.73M2
GLUCOSE BLD-MCNC: 74 MG/DL (ref 70–125)
HCT VFR BLD AUTO: 27.6 % (ref 40–54)
HGB BLD-MCNC: 9 G/DL (ref 14–18)
IMM GRANULOCYTES # BLD: 0 THOU/UL
IMM GRANULOCYTES NFR BLD: 1 %
IRON SATN MFR SERPL: 20 % (ref 20–50)
IRON SERPL-MCNC: 50 UG/DL (ref 42–175)
LYMPHOCYTES # BLD AUTO: 2.8 THOU/UL (ref 0.8–4.4)
LYMPHOCYTES NFR BLD AUTO: 35 % (ref 20–40)
MCH RBC QN AUTO: 30.2 PG (ref 27–34)
MCHC RBC AUTO-ENTMCNC: 32.6 G/DL (ref 32–36)
MCV RBC AUTO: 93 FL (ref 80–100)
MONOCYTES # BLD AUTO: 1 THOU/UL (ref 0–0.9)
MONOCYTES NFR BLD AUTO: 12 % (ref 2–10)
NEUTROPHILS # BLD AUTO: 4 THOU/UL (ref 2–7.7)
NEUTROPHILS NFR BLD AUTO: 50 % (ref 50–70)
PLATELET # BLD AUTO: 265 THOU/UL (ref 140–440)
PMV BLD AUTO: 11 FL (ref 8.5–12.5)
POTASSIUM BLD-SCNC: 3.4 MMOL/L (ref 3.5–5)
RBC # BLD AUTO: 2.98 MILL/UL (ref 4.4–6.2)
SODIUM SERPL-SCNC: 138 MMOL/L (ref 136–145)
TIBC SERPL-MCNC: 252 UG/DL (ref 313–563)
TRANSFERRIN SERPL-MCNC: 202 MG/DL (ref 212–360)
VIT B12 SERPL-MCNC: 421 PG/ML (ref 213–816)
WBC: 7.9 THOU/UL (ref 4–11)

## 2021-05-06 ENCOUNTER — RECORDS - HEALTHEAST (OUTPATIENT)
Dept: LAB | Facility: CLINIC | Age: 86
End: 2021-05-06

## 2021-05-10 LAB
ALBUMIN PERCENT: 62 % (ref 51–67)
ALBUMIN SERPL ELPH-MCNC: 3.5 G/DL (ref 3.2–4.7)
ALPHA 1 PERCENT: 3.1 % (ref 2–4)
ALPHA 2 PERCENT: 11.7 % (ref 5–13)
ALPHA1 GLOB SERPL ELPH-MCNC: 0.2 G/DL (ref 0.1–0.3)
ALPHA2 GLOB SERPL ELPH-MCNC: 0.7 G/DL (ref 0.4–0.9)
B-GLOBULIN SERPL ELPH-MCNC: 0.8 G/DL (ref 0.7–1.2)
BETA PERCENT: 13.8 % (ref 10–17)
GAMMA GLOB SERPL ELPH-MCNC: 0.5 G/DL (ref 0.6–1.4)
GAMMA GLOBULIN PERCENT: 9.4 % (ref 9–20)
PATH ICD:: ABNORMAL
PROT PATTERN SERPL ELPH-IMP: ABNORMAL
PROT SERPL-MCNC: 5.7 G/DL (ref 6–8)
REVIEWING PATHOLOGIST: ABNORMAL

## 2021-08-09 ENCOUNTER — LAB REQUISITION (OUTPATIENT)
Dept: LAB | Facility: CLINIC | Age: 86
End: 2021-08-09
Payer: COMMERCIAL

## 2021-08-09 DIAGNOSIS — U07.1 COVID-19: ICD-10-CM

## 2021-08-11 PROCEDURE — U0005 INFEC AGEN DETEC AMPLI PROBE: HCPCS | Mod: ORL

## 2021-08-12 LAB — SARS-COV-2 RNA RESP QL NAA+PROBE: NEGATIVE

## 2021-08-16 ENCOUNTER — LAB REQUISITION (OUTPATIENT)
Dept: LAB | Facility: CLINIC | Age: 86
End: 2021-08-16
Payer: COMMERCIAL

## 2021-08-18 PROCEDURE — U0005 INFEC AGEN DETEC AMPLI PROBE: HCPCS | Mod: ORL

## 2021-08-19 LAB — SARS-COV-2 RNA RESP QL NAA+PROBE: NEGATIVE

## 2021-08-24 ENCOUNTER — LAB REQUISITION (OUTPATIENT)
Dept: LAB | Facility: CLINIC | Age: 86
End: 2021-08-24
Payer: COMMERCIAL

## 2021-08-25 PROCEDURE — U0003 INFECTIOUS AGENT DETECTION BY NUCLEIC ACID (DNA OR RNA); SEVERE ACUTE RESPIRATORY SYNDROME CORONAVIRUS 2 (SARS-COV-2) (CORONAVIRUS DISEASE [COVID-19]), AMPLIFIED PROBE TECHNIQUE, MAKING USE OF HIGH THROUGHPUT TECHNOLOGIES AS DESCRIBED BY CMS-2020-01-R: HCPCS | Mod: ORL

## 2021-08-26 LAB — SARS-COV-2 RNA RESP QL NAA+PROBE: NEGATIVE

## 2021-08-30 ENCOUNTER — LAB REQUISITION (OUTPATIENT)
Dept: LAB | Facility: CLINIC | Age: 86
End: 2021-08-30
Payer: COMMERCIAL

## 2021-09-01 PROCEDURE — U0003 INFECTIOUS AGENT DETECTION BY NUCLEIC ACID (DNA OR RNA); SEVERE ACUTE RESPIRATORY SYNDROME CORONAVIRUS 2 (SARS-COV-2) (CORONAVIRUS DISEASE [COVID-19]), AMPLIFIED PROBE TECHNIQUE, MAKING USE OF HIGH THROUGHPUT TECHNOLOGIES AS DESCRIBED BY CMS-2020-01-R: HCPCS | Mod: ORL

## 2021-09-03 ENCOUNTER — LAB REQUISITION (OUTPATIENT)
Dept: LAB | Facility: CLINIC | Age: 86
End: 2021-09-03
Payer: COMMERCIAL

## 2021-09-03 LAB — SARS-COV-2 RNA RESP QL NAA+PROBE: NEGATIVE

## 2021-09-08 PROCEDURE — U0005 INFEC AGEN DETEC AMPLI PROBE: HCPCS | Mod: ORL

## 2021-09-09 LAB — SARS-COV-2 RNA RESP QL NAA+PROBE: NEGATIVE

## 2021-09-12 ENCOUNTER — LAB REQUISITION (OUTPATIENT)
Dept: LAB | Facility: CLINIC | Age: 86
End: 2021-09-12
Payer: COMMERCIAL

## 2021-09-12 LAB — SARS-COV-2 RNA RESP QL NAA+PROBE: NEGATIVE

## 2021-09-12 PROCEDURE — U0003 INFECTIOUS AGENT DETECTION BY NUCLEIC ACID (DNA OR RNA); SEVERE ACUTE RESPIRATORY SYNDROME CORONAVIRUS 2 (SARS-COV-2) (CORONAVIRUS DISEASE [COVID-19]), AMPLIFIED PROBE TECHNIQUE, MAKING USE OF HIGH THROUGHPUT TECHNOLOGIES AS DESCRIBED BY CMS-2020-01-R: HCPCS | Mod: ORL | Performed by: FAMILY MEDICINE

## 2021-09-13 ENCOUNTER — LAB REQUISITION (OUTPATIENT)
Dept: LAB | Facility: CLINIC | Age: 86
End: 2021-09-13
Payer: COMMERCIAL

## 2021-09-13 LAB
ANION GAP SERPL CALCULATED.3IONS-SCNC: 21 MMOL/L (ref 5–18)
BUN SERPL-MCNC: 31 MG/DL (ref 8–28)
CALCIUM SERPL-MCNC: 8.9 MG/DL (ref 8.5–10.5)
CHLORIDE BLD-SCNC: 98 MMOL/L (ref 98–107)
CO2 SERPL-SCNC: 15 MMOL/L (ref 22–31)
CREAT SERPL-MCNC: 1.66 MG/DL (ref 0.7–1.3)
GFR SERPL CREATININE-BSD FRML MDRD: 35 ML/MIN/1.73M2
GLUCOSE BLD-MCNC: 87 MG/DL (ref 70–125)
POTASSIUM BLD-SCNC: 3.8 MMOL/L (ref 3.5–5)
PROCALCITONIN SERPL-MCNC: 0.22 NG/ML (ref 0–0.49)
SODIUM SERPL-SCNC: 134 MMOL/L (ref 136–145)

## 2021-09-13 PROCEDURE — 80048 BASIC METABOLIC PNL TOTAL CA: CPT | Mod: ORL

## 2021-09-13 PROCEDURE — 84145 PROCALCITONIN (PCT): CPT | Mod: ORL

## 2021-09-14 ENCOUNTER — LAB REQUISITION (OUTPATIENT)
Dept: LAB | Facility: CLINIC | Age: 86
End: 2021-09-14
Payer: COMMERCIAL

## 2021-09-15 PROCEDURE — U0003 INFECTIOUS AGENT DETECTION BY NUCLEIC ACID (DNA OR RNA); SEVERE ACUTE RESPIRATORY SYNDROME CORONAVIRUS 2 (SARS-COV-2) (CORONAVIRUS DISEASE [COVID-19]), AMPLIFIED PROBE TECHNIQUE, MAKING USE OF HIGH THROUGHPUT TECHNOLOGIES AS DESCRIBED BY CMS-2020-01-R: HCPCS | Mod: ORL

## 2021-09-16 LAB — SARS-COV-2 RNA RESP QL NAA+PROBE: NEGATIVE

## 2021-09-20 ENCOUNTER — LAB REQUISITION (OUTPATIENT)
Dept: LAB | Facility: CLINIC | Age: 86
End: 2021-09-20
Payer: COMMERCIAL

## 2021-09-22 PROCEDURE — U0003 INFECTIOUS AGENT DETECTION BY NUCLEIC ACID (DNA OR RNA); SEVERE ACUTE RESPIRATORY SYNDROME CORONAVIRUS 2 (SARS-COV-2) (CORONAVIRUS DISEASE [COVID-19]), AMPLIFIED PROBE TECHNIQUE, MAKING USE OF HIGH THROUGHPUT TECHNOLOGIES AS DESCRIBED BY CMS-2020-01-R: HCPCS | Mod: ORL

## 2021-09-23 LAB — SARS-COV-2 RNA RESP QL NAA+PROBE: NEGATIVE

## 2022-02-17 PROBLEM — K21.9 GASTROESOPHAGEAL REFLUX DISEASE: Status: ACTIVE | Noted: 2018-02-27
